# Patient Record
Sex: FEMALE | Race: OTHER | HISPANIC OR LATINO | URBAN - METROPOLITAN AREA
[De-identification: names, ages, dates, MRNs, and addresses within clinical notes are randomized per-mention and may not be internally consistent; named-entity substitution may affect disease eponyms.]

---

## 2020-04-14 ENCOUNTER — EMERGENCY (EMERGENCY)
Facility: HOSPITAL | Age: 25
LOS: 1 days | Discharge: HOME | End: 2020-04-14
Attending: EMERGENCY MEDICINE | Admitting: EMERGENCY MEDICINE
Payer: MEDICAID

## 2020-04-14 VITALS
RESPIRATION RATE: 20 BRPM | OXYGEN SATURATION: 97 % | HEART RATE: 88 BPM | SYSTOLIC BLOOD PRESSURE: 110 MMHG | DIASTOLIC BLOOD PRESSURE: 80 MMHG

## 2020-04-14 VITALS
HEART RATE: 106 BPM | RESPIRATION RATE: 17 BRPM | SYSTOLIC BLOOD PRESSURE: 110 MMHG | DIASTOLIC BLOOD PRESSURE: 78 MMHG | TEMPERATURE: 99 F | OXYGEN SATURATION: 100 %

## 2020-04-14 PROCEDURE — 99284 EMERGENCY DEPT VISIT MOD MDM: CPT | Mod: 25

## 2020-04-14 PROCEDURE — 71045 X-RAY EXAM CHEST 1 VIEW: CPT | Mod: 26

## 2020-04-14 PROCEDURE — 93010 ELECTROCARDIOGRAM REPORT: CPT

## 2020-04-14 RX ORDER — ACETAMINOPHEN 500 MG
650 TABLET ORAL ONCE
Refills: 0 | Status: COMPLETED | OUTPATIENT
Start: 2020-04-14 | End: 2020-04-14

## 2020-04-14 RX ADMIN — Medication 650 MILLIGRAM(S): at 12:48

## 2020-04-14 NOTE — ED PROVIDER NOTE - CLINICAL SUMMARY MEDICAL DECISION MAKING FREE TEXT BOX
26 y/o female with hx of asthma presenting to the ED c/o cough, SOB, chest discomfort, body aches x 4 days. Patient tachy, EKG with isolated t wave inversion in lead III and V3, otherwise vitals stable and patient not hypoxic at rest or on ambulatory trial (97% on RA). Will obtain CXR, reassess.

## 2020-04-14 NOTE — ED ADULT NURSE NOTE - OBJECTIVE STATEMENT
flu-like symptoms x4 days with worsening SOB and chest tightness. PTs mother is COVID-19 positive. Only PMH is asthma.

## 2020-04-14 NOTE — ED PROVIDER NOTE - OBJECTIVE STATEMENT
26 y/o female with hx of asthma is presenting to the ED c/o SOB x 3 days. Reports cough, body aches, chest tightness since April 11th and has been using her albuterol inhaler more frequently than normal. Her mother was dx with COVID. She denies fever or chills. Reports occasional nausea, no vomiting or diarrhea. States she feels lightheaded when she walks. Smokes hookah.

## 2020-04-14 NOTE — ED PROVIDER NOTE - NS ED ROS FT
CONST: no fevers, no chills  EYES: no pain, no vision changes  ENT: no sore throat, no ear pain, no change in hearing  CV: + chest pain, no leg swelling  RESP: + shortness of breath, + cough  ABD: no abdominal pain, + nausea, no vomiting, no diarrhea  : no dysuria, no flank pain, no hematuria  MSK: no back pain, no extremity pain  NEURO: no headache or additional neurologic complaints  HEME: no easy bleeding  SKIN:  no rash

## 2020-04-14 NOTE — ED ADULT TRIAGE NOTE - CHIEF COMPLAINT QUOTE
Pt c/o SOB, tightness of chest, body aches, dizziness, cough and sweats x4 days.  Mother tested positive for covid 19.  Hx asthma

## 2020-04-14 NOTE — ED PROVIDER NOTE - PHYSICAL EXAMINATION
GENERAL: Awake, alert, NAD  HEENT: NC/AT, moist mucous membranes, PERRL  LUNGS: CTAB, no wheezes or crackles   CARDIAC: tachycardic, regular rhythm, no m/r/g  ABDOMEN: Soft, normal BS, non tender, non distended, no rebound, no guarding  BACK: No midline spinal tenderness, no CVA tenderness  EXT: No edema, no calf tenderness, 2+ DP pulses bilaterally, no deformities.  NEURO: A&Ox3. Moving all extremities.  SKIN: Warm and dry. No rash.  PSYCH: Normal affect.

## 2020-04-14 NOTE — ED PROVIDER NOTE - NSFOLLOWUPINSTRUCTIONS_ED_ALL_ED_FT
Your symptoms are consistent with the new novel coronavirus, COVID-19.   Please self-isolate at home for 14 days from symptom onset.   Please only return to the emergency department if you experience difficulty breathing, unrelenting fevers, or are not able to tolerate food/liquids by mouth. Otherwise, please recover at home.

## 2020-04-14 NOTE — ED PROVIDER NOTE - PROGRESS NOTE DETAILS
pt no distress, walked back from bathroom without distress. talking in full sentences. pulse ox 97 RA. will dc home. strict return precautions. pt no distress, walked back from bathroom without distress. talking in full sentences. ambulatory pulse ox 97 RA. urine hcg neg. will dc home. strict return precautions. pt requested to have urine check, no dysuria/hematuria or frequency. ua: neg for nitrite/leuk no ketone. will dc home.

## 2020-04-17 ENCOUNTER — EMERGENCY (EMERGENCY)
Facility: HOSPITAL | Age: 25
LOS: 1 days | Discharge: HOME | End: 2020-04-17
Attending: EMERGENCY MEDICINE
Payer: MEDICAID

## 2020-04-17 VITALS
SYSTOLIC BLOOD PRESSURE: 107 MMHG | OXYGEN SATURATION: 98 % | TEMPERATURE: 99 F | DIASTOLIC BLOOD PRESSURE: 66 MMHG | HEART RATE: 106 BPM | RESPIRATION RATE: 22 BRPM

## 2020-04-17 VITALS — RESPIRATION RATE: 20 BRPM | OXYGEN SATURATION: 97 %

## 2020-04-17 PROCEDURE — 99283 EMERGENCY DEPT VISIT LOW MDM: CPT | Mod: 25

## 2020-04-17 PROCEDURE — 93010 ELECTROCARDIOGRAM REPORT: CPT

## 2020-04-17 PROCEDURE — 71045 X-RAY EXAM CHEST 1 VIEW: CPT | Mod: 26

## 2020-04-17 RX ORDER — ACETAMINOPHEN 500 MG
650 TABLET ORAL ONCE
Refills: 0 | Status: COMPLETED | OUTPATIENT
Start: 2020-04-17 | End: 2020-04-17

## 2020-04-17 RX ADMIN — Medication 650 MILLIGRAM(S): at 07:57

## 2020-04-17 NOTE — ED PROVIDER NOTE - NSFOLLOWUPINSTRUCTIONS_ED_ALL_ED_FT
YOU WERE SEEN IN THE ED FOR A LIKELY VIRAL ILLNESS. WE CANNOT PERFORM DEFINITIVE TESTING AT THIS TIME FOR THE NOVEL CORONAVIRUS. PLEASE READ THE INFORMATION PACKET PROVIDED TO YOU CAREFULLY.     YOU SHOULD SELF-QUARANTINE FOR 14 DAYS TO AVOID POTENTIAL SPREAD OF THE CORONAVIRUS.     YOU WERE TESTED FOR MORE COMMON VIRUSES (THE FLU AND COMMON COLD VIRUSES). YOU WILL BE CONTACTED IF YOU TEST POSITIVE FOR ANY OTHER VIRUSES.    YOU WILL BE CONTACTED BY [facility] OR YOUR LOCAL HEALTH DEPARTMENT AFTER YOUR DISCHARGE TO HELP GUIDE YOU IN YOUR QUARANTINE AND ADVISE YOU FURTHER.    Return to the ED for difficulty breathing.    You may over the counter acetaminophen (Tylenol) 650mg every 6 hours as needed for fever or pain. There is some concern in the medical community about using ibuprofen (Advil, Motrin) and other NSAIDs in people with COVID infections and until there is more research on this subject it may be best to avoid NSAIDs like ibuprofen at the moment unless you have an allergy to acetaminophen (Tylenol).  Do NOT exceed 3500mg acetaminophen in 24 hours.  Please do not take these medications if you do not have pain or fever or if you have any history of liver disease.    -------------    What is a coronavirus?  Coronaviruses are a large family of viruses that cause illnesses ranging from the common cold to more severe diseases such as Middle East Respiratory Syndrome (MERS) and Severe Acute Respiratory Syndrome (SARS).    What is Novel Coronavirus (COVID-19)?  The Centers for Disease Control and Prevention (CDC) is closely monitoring the outbreak caused by COVID-19. For the latest information about COVID-19, visit the CDC website at CDC.gov/Coronavirus    How are coronaviruses spread?  Coronaviruses can be transmitted from person to person, usually after close contact with an infected  person (for example, in a household, workplace, or healthcare setting), via droplets that become airborne after a cough or sneeze. These droplets can then infect a nearby person. Transmission can also occur by touching recently contaminated surfaces.    Is there a treatment for a COVID-19?  There is no specific treatment for disease caused by COVID-19. However, many of the symptoms can be treated based on the patient’s clinical condition. Supportive care for infected persons can be highly effective.    What are the symptoms of coronavirus infection?  It depends on the virus, but common signs include fever and/or respiratory symptoms such as cough and shortness of breath. In more severe cases, infection can cause pneumonia, severe acute respiratory syndrome, kidney failure and even death. Fortunately, most cases of COVID-19 have an illness no different than the influenza (flu), with a majority of these patients having mild symptoms and overall mortality which appears to be not much different than the flu.    What can I do to protect myself?  The best precautionary measures:  – washing your hands  – covering your cough  – disinfecting surfaces  – it is also advisable to avoid close contact with anyone showing symptoms of respiratory illness such as coughing and sneezing  – those with symptoms should wear a surgical mask when around others    What can I do to protect those around me?  If you have been identified as someone who may be infected with COVID-19, we recommend you follow the self-isolation procedures outlined on the following page to protect those around you and to limit the spread of this virus.    We recommend the below precautionary steps from now until 14 days from when you returned from your travel or date of your last known possible contact:    — Do not go to work, school or public areas. Avoid using public transportation, ridesharing or taxis.  — As much as possible, separate yourself from other people in your home. If you can, you should stay in a room and away from other people. Also, you should use a separate bathroom if available.  — Wear the supplied mask whenever you are around other people.  — If you have a non-urgent medical appointment, please reschedule for a later date. If the appointment is urgent, please call the health care provider and tell them that you are on self-isolation for possible COVID-19. This will help the health care provider’s office take steps to keep other people from getting infected or exposed. If you can reschedule routine appointments, do so.  — Wash your hands often with soap and water for at least 15 to 20 seconds or clean your hands with an alcohol-based hand  that contains 60 to 95% alcohol, covering all surfaces of your hands and rubbing them together until they feel dry. Soap and water should be used preferentially if hands are visibly dirty.  — Cover your mouth and nose with a tissue when you cough or sneeze. Throw used tissues in a lined trash can. Immediately wash your hands.  — Avoid touching your eyes, nose, and mouth with your hands.  — Avoid sharing personal household items. You should not share dishes, drinking glasses, cups, eating utensils, towels, or bedding with other people or pets in your home. After using these items, they should be washed thoroughly with soap and water.  — Clean and disinfect all “high-touch” surfaces every day. High touch surfaces include counters, tabletops, doorknobs, light switches, remote controls, bathroom fixtures, toilets, phones, keyboards, tablets, and bedside tables. Also, clean any surfaces that may have blood, stool, or body fluids on them.

## 2020-04-17 NOTE — ED PROVIDER NOTE - ATTENDING CONTRIBUTION TO CARE
I performed a history and physical exam of the patient and discussed their management with the resident and /or advanced care provider. I reviewed the resident and /or ACP's note and agree with the documented findings and plan of care. My medical decison making and observations are found above.    I have supervised and agree with the above resident history, physical and plan with the noted differences.    viktoria: pt has been caring for mom who is covid positive.  presents with fevers, myalgias, cough, sat 100% room air, able to speak in sentences, and clear lungs.  chest film no pna or haziness.  ok to dc with good instructions for return.

## 2020-04-17 NOTE — ED PROVIDER NOTE - PATIENT PORTAL LINK FT
You can access the FollowMyHealth Patient Portal offered by [facility] by registering at the following website: [email or web address]. By joining [employer]’s FollowMyHealth portal, you will also be able to view your health information using other applications (apps) compatible with our system.

## 2020-04-17 NOTE — ED PROVIDER NOTE - NSFOLLOWUPCLINICS_GEN_ALL_ED_FT
[facility]  Cardiology  [address] [place] [address]  Phone: [phone]  Fax:   Follow Up Time: 1-3 Days

## 2020-04-17 NOTE — ED PROVIDER NOTE - NS ED ROS FT
General: + fever, + chills  HENT: denies nasal congestion, rhinorrhea  CV: denies chest pain, + palpitations  Resp: + difficulty breathing, + cough  Abdominal: denies nausea, vomiting, abdominal pain  MSK: denies muscle aches, leg swelling  Neuro: denies headaches, numbness, tingling  Skin: denies rashes, bruises

## 2020-04-17 NOTE — ED PROVIDER NOTE - CARE PROVIDER_API CALL
Mike Bates (DO)  Cardiovascular Disease; Internal Medicine; Nuclear Cardiology  [address] [place] [address], [place] [address]  Phone: [phone]  Fax: [phone]  Follow Up Time: 1-3 Days

## 2020-04-17 NOTE — ED PROVIDER NOTE - PROGRESS NOTE DETAILS
Friedman: no brugada, wpw or prolonged qt noted one ekg Friedman: amb sat 97% Friedman: rj sat 97%, provided cards follow up list, return precautions provided

## 2020-04-17 NOTE — ED ADULT NURSE NOTE - NSIMPLEMENTINTERV_GEN_ALL_ED
Implemented All Universal Safety Interventions:  [place] to call system. Call bell, personal items and telephone within reach. Instruct patient to call for assistance. Room bathroom lighting operational. Non-slip footwear when patient is off stretcher. Physically safe environment: no spills, clutter or unnecessary equipment. Stretcher in lowest position, wheels locked, appropriate side rails in place.

## 2020-04-17 NOTE — ED PROVIDER NOTE - OBJECTIVE STATEMENT
25F w/ h/o asthma p/w SOB, fevers, body aches for the past 6 days. States she woke up w/ palpitations this morning, prompting ED visit. States her mother was diagnosed w/ covid 19 and she has been taking care of her for two weeks. Denies n/v/d.

## 2020-04-17 NOTE — ED ADULT NURSE NOTE - OBJECTIVE STATEMENT
pt a&o x3 c/o sob, fevers and bodyaches x 6 days. reports chest pain today. states her mother is + covid. nad noted. md assessed. will monitor

## 2020-04-17 NOTE — ED PROVIDER NOTE - CLINICAL SUMMARY MEDICAL DECISION MAKING FREE TEXT BOX
25F w/ h/o asthma p/w symptoms consistent w/ viral illness, sating 100% on RA, lungs ctab, no wheezes, doubt asthma exacerbation, will obtain ekg for palpitations, cxr, tylenol 25F w/ h/o asthma p/w symptoms consistent w/ viral illness, sating 100% on RA, lungs ctab, no wheezes, doubt asthma exacerbation, will obtain ekg for palpitations, cxr, tylenol    viktoria: pt has been caring for mom who is covid positive.  presents with fevers, myalgias, cough, sat 100% room air, able to speak in sentences, and clear lungs.  chest film no pna or haziness.  ok to dc with good instructions for return.

## 2020-04-17 NOTE — ED ADULT TRIAGE NOTE - CHIEF COMPLAINT QUOTE
pt arrives w/ c/o chest pain, sob, cough. pt states her mom has covid-19 and she was around her. pt states she was here on 4/11 and was discharged home after only getting Tylenol.

## 2020-07-01 ENCOUNTER — EMERGENCY (EMERGENCY)
Facility: HOSPITAL | Age: 25
LOS: 1 days | Discharge: HOME | End: 2020-07-01
Attending: EMERGENCY MEDICINE | Admitting: EMERGENCY MEDICINE
Payer: MEDICAID

## 2020-07-01 VITALS
OXYGEN SATURATION: 100 % | TEMPERATURE: 98 F | SYSTOLIC BLOOD PRESSURE: 106 MMHG | HEART RATE: 105 BPM | DIASTOLIC BLOOD PRESSURE: 67 MMHG | RESPIRATION RATE: 16 BRPM

## 2020-07-01 VITALS
OXYGEN SATURATION: 100 % | RESPIRATION RATE: 16 BRPM | SYSTOLIC BLOOD PRESSURE: 105 MMHG | DIASTOLIC BLOOD PRESSURE: 64 MMHG | HEART RATE: 128 BPM | TEMPERATURE: 100 F

## 2020-07-01 PROBLEM — J45.909 UNSPECIFIED ASTHMA, UNCOMPLICATED: Chronic | Status: ACTIVE | Noted: 2020-04-17

## 2020-07-01 LAB
ALBUMIN SERPL ELPH-MCNC: 4.7 G/DL — SIGNIFICANT CHANGE UP (ref 3.3–5)
ALP SERPL-CCNC: 75 U/L — SIGNIFICANT CHANGE UP (ref 40–120)
ALT FLD-CCNC: 9 U/L — SIGNIFICANT CHANGE UP (ref 4–33)
ANION GAP SERPL CALC-SCNC: 15 MMO/L — HIGH (ref 7–14)
AST SERPL-CCNC: 19 U/L — SIGNIFICANT CHANGE UP (ref 4–32)
BASE EXCESS BLDV CALC-SCNC: -1.5 MMOL/L — SIGNIFICANT CHANGE UP
BASOPHILS # BLD AUTO: 0.01 K/UL — SIGNIFICANT CHANGE UP (ref 0–0.2)
BASOPHILS NFR BLD AUTO: 0.1 % — SIGNIFICANT CHANGE UP (ref 0–2)
BILIRUB SERPL-MCNC: 0.6 MG/DL — SIGNIFICANT CHANGE UP (ref 0.2–1.2)
BLOOD GAS VENOUS - CREATININE: 0.69 MG/DL — SIGNIFICANT CHANGE UP (ref 0.5–1.3)
BUN SERPL-MCNC: 10 MG/DL — SIGNIFICANT CHANGE UP (ref 7–23)
CALCIUM SERPL-MCNC: 9.2 MG/DL — SIGNIFICANT CHANGE UP (ref 8.4–10.5)
CHLORIDE BLDV-SCNC: 106 MMOL/L — SIGNIFICANT CHANGE UP (ref 96–108)
CHLORIDE SERPL-SCNC: 98 MMOL/L — SIGNIFICANT CHANGE UP (ref 98–107)
CO2 SERPL-SCNC: 21 MMOL/L — LOW (ref 22–31)
CREAT SERPL-MCNC: 0.58 MG/DL — SIGNIFICANT CHANGE UP (ref 0.5–1.3)
CULTURE RESULTS: SIGNIFICANT CHANGE UP
EOSINOPHIL # BLD AUTO: 0 K/UL — SIGNIFICANT CHANGE UP (ref 0–0.5)
EOSINOPHIL NFR BLD AUTO: 0 % — SIGNIFICANT CHANGE UP (ref 0–6)
GAS PNL BLDV: 134 MMOL/L — LOW (ref 136–146)
GLUCOSE BLDV-MCNC: 114 MG/DL — HIGH (ref 70–99)
GLUCOSE SERPL-MCNC: 117 MG/DL — HIGH (ref 70–99)
HCO3 BLDV-SCNC: 23 MMOL/L — SIGNIFICANT CHANGE UP (ref 20–27)
HCT VFR BLD CALC: 39.8 % — SIGNIFICANT CHANGE UP (ref 34.5–45)
HCT VFR BLDV CALC: 44.1 % — SIGNIFICANT CHANGE UP (ref 34.5–45)
HGB BLD-MCNC: 13.8 G/DL — SIGNIFICANT CHANGE UP (ref 11.5–15.5)
HGB BLDV-MCNC: 14.4 G/DL — SIGNIFICANT CHANGE UP (ref 11.5–15.5)
IMM GRANULOCYTES NFR BLD AUTO: 0.7 % — SIGNIFICANT CHANGE UP (ref 0–1.5)
LACTATE BLDV-MCNC: 1.1 MMOL/L — SIGNIFICANT CHANGE UP (ref 0.5–2)
LYMPHOCYTES # BLD AUTO: 0.53 K/UL — LOW (ref 1–3.3)
LYMPHOCYTES # BLD AUTO: 7.7 % — LOW (ref 13–44)
MCHC RBC-ENTMCNC: 31.2 PG — SIGNIFICANT CHANGE UP (ref 27–34)
MCHC RBC-ENTMCNC: 34.7 % — SIGNIFICANT CHANGE UP (ref 32–36)
MCV RBC AUTO: 90 FL — SIGNIFICANT CHANGE UP (ref 80–100)
MONOCYTES # BLD AUTO: 0.39 K/UL — SIGNIFICANT CHANGE UP (ref 0–0.9)
MONOCYTES NFR BLD AUTO: 5.6 % — SIGNIFICANT CHANGE UP (ref 2–14)
NEUTROPHILS # BLD AUTO: 5.93 K/UL — SIGNIFICANT CHANGE UP (ref 1.8–7.4)
NEUTROPHILS NFR BLD AUTO: 85.9 % — HIGH (ref 43–77)
NRBC # FLD: 0 K/UL — SIGNIFICANT CHANGE UP (ref 0–0)
PCO2 BLDV: 35 MMHG — LOW (ref 41–51)
PH BLDV: 7.42 PH — SIGNIFICANT CHANGE UP (ref 7.32–7.43)
PLATELET # BLD AUTO: 269 K/UL — SIGNIFICANT CHANGE UP (ref 150–400)
PMV BLD: 10.1 FL — SIGNIFICANT CHANGE UP (ref 7–13)
PO2 BLDV: 62 MMHG — HIGH (ref 35–40)
POTASSIUM BLDV-SCNC: 3.3 MMOL/L — LOW (ref 3.4–4.5)
POTASSIUM SERPL-MCNC: 3.3 MMOL/L — LOW (ref 3.5–5.3)
POTASSIUM SERPL-SCNC: 3.3 MMOL/L — LOW (ref 3.5–5.3)
PROT SERPL-MCNC: 7.8 G/DL — SIGNIFICANT CHANGE UP (ref 6–8.3)
RBC # BLD: 4.42 M/UL — SIGNIFICANT CHANGE UP (ref 3.8–5.2)
RBC # FLD: 11.8 % — SIGNIFICANT CHANGE UP (ref 10.3–14.5)
SAO2 % BLDV: 91.9 % — HIGH (ref 60–85)
SODIUM SERPL-SCNC: 134 MMOL/L — LOW (ref 135–145)
SPECIMEN SOURCE: SIGNIFICANT CHANGE UP
WBC # BLD: 6.91 K/UL — SIGNIFICANT CHANGE UP (ref 3.8–10.5)
WBC # FLD AUTO: 6.91 K/UL — SIGNIFICANT CHANGE UP (ref 3.8–10.5)

## 2020-07-01 PROCEDURE — 99284 EMERGENCY DEPT VISIT MOD MDM: CPT

## 2020-07-01 RX ORDER — ONDANSETRON 8 MG/1
4 TABLET, FILM COATED ORAL ONCE
Refills: 0 | Status: COMPLETED | OUTPATIENT
Start: 2020-07-01 | End: 2020-07-01

## 2020-07-01 RX ORDER — ONDANSETRON 8 MG/1
1 TABLET, FILM COATED ORAL
Qty: 12 | Refills: 0
Start: 2020-07-01

## 2020-07-01 RX ORDER — ACETAMINOPHEN 500 MG
975 TABLET ORAL ONCE
Refills: 0 | Status: COMPLETED | OUTPATIENT
Start: 2020-07-01 | End: 2020-07-01

## 2020-07-01 RX ORDER — POTASSIUM CHLORIDE 20 MEQ
40 PACKET (EA) ORAL ONCE
Refills: 0 | Status: COMPLETED | OUTPATIENT
Start: 2020-07-01 | End: 2020-07-01

## 2020-07-01 RX ORDER — SODIUM CHLORIDE 9 MG/ML
1000 INJECTION INTRAMUSCULAR; INTRAVENOUS; SUBCUTANEOUS ONCE
Refills: 0 | Status: COMPLETED | OUTPATIENT
Start: 2020-07-01 | End: 2020-07-01

## 2020-07-01 RX ADMIN — SODIUM CHLORIDE 1000 MILLILITER(S): 9 INJECTION INTRAMUSCULAR; INTRAVENOUS; SUBCUTANEOUS at 12:37

## 2020-07-01 RX ADMIN — Medication 40 MILLIEQUIVALENT(S): at 11:54

## 2020-07-01 RX ADMIN — SODIUM CHLORIDE 1000 MILLILITER(S): 9 INJECTION INTRAMUSCULAR; INTRAVENOUS; SUBCUTANEOUS at 11:54

## 2020-07-01 RX ADMIN — SODIUM CHLORIDE 1000 MILLILITER(S): 9 INJECTION INTRAMUSCULAR; INTRAVENOUS; SUBCUTANEOUS at 10:50

## 2020-07-01 RX ADMIN — ONDANSETRON 4 MILLIGRAM(S): 8 TABLET, FILM COATED ORAL at 10:50

## 2020-07-01 RX ADMIN — Medication 975 MILLIGRAM(S): at 10:45

## 2020-07-01 NOTE — ED PROVIDER NOTE - PROGRESS NOTE DETAILS
GOGO Nassar: Pt tolerated PO, stool cultures sent, will give GI follow up if sx persist and advised BRAT diet.  Vitals stable will dc home

## 2020-07-01 NOTE — ED PROVIDER NOTE - NSFOLLOWUPINSTRUCTIONS_ED_ALL_ED_FT
Take zofran 4mg ODT every 4 hours as needed for nausea.  Take tylenol 650mg every 6-8 hours as needed for fever.  Avoid any fatty/greasy foods. Drink plenty of fluids. Follow BRAT diet-bananas, rice, applesauce and toast. Follow up with your pmd in 2days. Return to ED for any worsening symptoms.

## 2020-07-01 NOTE — ED PROVIDER NOTE - OBJECTIVE STATEMENT
26 yo female c pmhx asthma presents to ED c/o n/v/d x 1 day.  Pt states ate tacos night before yesterday and woke up yesterday AM with symptoms.  Pt states has been having persistent watery diarrhea and has "lost count" how many times she has gone.  has not been able to tolerate anything PO except for soup last night. +fever, + mild abdominal cramping.  No recent antibx use or recent travel.  No sick contacts.  Patient was COVID positive April 11.  LMP 2 weeks ago.

## 2020-07-01 NOTE — ED ADULT NURSE NOTE - OBJECTIVE STATEMENT
24 y/o female presents to ED with c/o nausea and diarrhea since last night.  Pt states that she started having watery diarrhea last night, multiple episodes, no blood noted in stool.  Pt also endorses chills, temp not taken at home.  Pt arrived in ED awake alert in NAD, pt denies cough, no abd pain, no CP, no SOB.  Bobbi and urine samples collected ad per order, IV established, labs drawn and sent, pt medicated as per order, providers evaluating

## 2020-07-01 NOTE — ED PROVIDER NOTE - CLINICAL SUMMARY MEDICAL DECISION MAKING FREE TEXT BOX
26 yo female c pmhx asthma presents to ED c/o n/v/d x 1 day.  +fevers, >10 episodes of watery diarrhea.  No risk factors for c.diff.  Abd soft NT, unlikely colitis or appendicitis.  r/o gastroenteritis viral vs bacterial, will check labs, send stool culture and PCR panel.  Antiemetics, fluids, tylenol for fever and reassess.

## 2020-07-01 NOTE — ED PROVIDER NOTE - PATIENT PORTAL LINK FT
You can access the FollowMyHealth Patient Portal offered by [facility] by registering at the following website: [email or web address]. By joining [email or web address]’s FollowMyHealth portal, you will also be able to view your health information using other applications (apps) compatible with our system.

## 2020-07-02 RX ORDER — AZTREONAM 2 G
1 VIAL (EA) INJECTION
Qty: 20 | Refills: 0
Start: 2020-07-02 | End: 2020-07-11

## 2020-07-02 NOTE — ED POST DISCHARGE NOTE - RESULT SUMMARY
Pt called with [facility]  ID# [id number], regarding stool culture results, which are positive for Cyclospora.  Pt confirms NKDA; Rx for Bactrim DS BID x 10 days called in to pt's pharmacy (pharmacy info verified with pt).

## 2020-07-03 LAB
CULTURE RESULTS: SIGNIFICANT CHANGE UP
SPECIMEN SOURCE: SIGNIFICANT CHANGE UP

## 2020-10-05 NOTE — ED ADULT NURSE NOTE - HOW OFTEN DO YOU HAVE A DRINK CONTAINING ALCOHOL?
Future Appointments   Date Time Provider Anthony Devine   10/15/2020 10:30 AM EM[facility] 20 NURSE EMG 20 [address] [address]   10/15/2020 11:00 AM Steve SANCHEZ, DANAEW [facility] [place]   11/27/2020  8:00 AM BK AALIYAH RM1 BK [address] [facility]

## 2022-01-02 ENCOUNTER — EMERGENCY (EMERGENCY)
Facility: HOSPITAL | Age: 27
LOS: 1 days | Discharge: HOME | End: 2022-01-02
Attending: STUDENT IN AN ORGANIZED HEALTH CARE EDUCATION/TRAINING PROGRAM | Admitting: STUDENT IN AN ORGANIZED HEALTH CARE EDUCATION/TRAINING PROGRAM
Payer: MEDICAID

## 2022-01-02 VITALS
DIASTOLIC BLOOD PRESSURE: 73 MMHG | RESPIRATION RATE: 17 BRPM | TEMPERATURE: 97 F | SYSTOLIC BLOOD PRESSURE: 113 MMHG | HEART RATE: 87 BPM

## 2022-01-02 VITALS
HEART RATE: 74 BPM | DIASTOLIC BLOOD PRESSURE: 68 MMHG | RESPIRATION RATE: 18 BRPM | SYSTOLIC BLOOD PRESSURE: 103 MMHG | OXYGEN SATURATION: 99 % | TEMPERATURE: 98 F

## 2022-01-02 LAB
APPEARANCE UR: CLEAR — SIGNIFICANT CHANGE UP
BILIRUB UR-MCNC: NEGATIVE — SIGNIFICANT CHANGE UP
COLOR SPEC: YELLOW — SIGNIFICANT CHANGE UP
DIFF PNL FLD: NEGATIVE — SIGNIFICANT CHANGE UP
GLUCOSE UR QL: NEGATIVE — SIGNIFICANT CHANGE UP
KETONES UR-MCNC: NEGATIVE — SIGNIFICANT CHANGE UP
LEUKOCYTE ESTERASE UR-ACNC: NEGATIVE — SIGNIFICANT CHANGE UP
NITRITE UR-MCNC: NEGATIVE — SIGNIFICANT CHANGE UP
PH UR: 8 — SIGNIFICANT CHANGE UP (ref 5–8)
PROT UR-MCNC: NEGATIVE — SIGNIFICANT CHANGE UP
SP GR SPEC: 1.01 — SIGNIFICANT CHANGE UP (ref 1–1.05)
UROBILINOGEN FLD QL: SIGNIFICANT CHANGE UP

## 2022-01-02 PROCEDURE — 76770 US EXAM ABDO BACK WALL COMP: CPT | Mod: 26

## 2022-01-02 PROCEDURE — 99284 EMERGENCY DEPT VISIT MOD MDM: CPT

## 2022-01-02 RX ORDER — NITROFURANTOIN MACROCRYSTAL 50 MG
100 CAPSULE ORAL ONCE
Refills: 0 | Status: COMPLETED | OUTPATIENT
Start: 2022-01-02 | End: 2022-01-02

## 2022-01-02 RX ORDER — IBUPROFEN 200 MG
400 TABLET ORAL ONCE
Refills: 0 | Status: COMPLETED | OUTPATIENT
Start: 2022-01-02 | End: 2022-01-02

## 2022-01-02 RX ORDER — NITROFURANTOIN MACROCRYSTAL 50 MG
1 CAPSULE ORAL
Qty: 14 | Refills: 0
Start: 2022-01-02 | End: 2022-01-08

## 2022-01-02 RX ORDER — ACETAMINOPHEN 500 MG
650 TABLET ORAL ONCE
Refills: 0 | Status: COMPLETED | OUTPATIENT
Start: 2022-01-02 | End: 2022-01-02

## 2022-01-02 RX ADMIN — Medication 100 MILLIGRAM(S): at 23:17

## 2022-01-02 RX ADMIN — Medication 400 MILLIGRAM(S): at 18:51

## 2022-01-02 RX ADMIN — Medication 650 MILLIGRAM(S): at 18:51

## 2022-01-02 NOTE — ED ADULT TRIAGE NOTE - CHIEF COMPLAINT QUOTE
pt states she is being treated for a UTI, but even after antibiotics pain has gotten worse and migrated to the flank.

## 2022-01-02 NOTE — ED PROVIDER NOTE - PATIENT PORTAL LINK FT
You can access the FollowMyHealth Patient Portal offered by [facility] by registering at the following website: [email or web address]. By joining [facility]’s FollowMyHealth portal, you will also be able to view your health information using other applications (apps) compatible with our system.

## 2022-01-02 NOTE — ED PROVIDER NOTE - PHYSICAL EXAMINATION
CONSTITUTIONAL: Non-toxic, non-diaphoretic, in no apparent distress  HEAD: Normocephalic; atruamatic  EYES: EOM intact   ENMT: External appears normal; normal oropharynx, moist  NECK: grossly normal active ROM,  CARD: No cyanosis, good peripheral perfusion, RRR, no MRG  RESP: Normal chest excursion with respiration; no increased work of breathing, CTAB  ABD: SNTND, no CVA tenderness  EXT: moving all extremities, no gross disfigurement or asymmetry,  SKIN: Warm, dry, no rash  NEURO:  moving all extremities, no facial droop, no dysarthria      cn2-12 intact

## 2022-01-02 NOTE — ED PROVIDER NOTE - OBJECTIVE STATEMENT
25 y/o F w/ PMHx asthma prsents w/ intermittent flank pain and abd pain w/ associated chills and lightheadedness x 2 wks. Pt is concerned for a UTI. Denies fever, vomiting, cough, diarrhea, burning when urination, or vaginal discharge. Pt is not sexually active and is a social smoker and drinker.

## 2022-01-02 NOTE — ED PROVIDER NOTE - PROGRESS NOTE DETAILS
patient given macrobid for urinary symptoms, possibility of false negative UA. Return precautions reviewed. Patient verbalized understand of conditions for return and plan for follow up.

## 2022-01-02 NOTE — ED PROVIDER NOTE - CLINICAL SUMMARY MEDICAL DECISION MAKING FREE TEXT BOX
27 y/o F presents w/ intermittent abd and flank pain x2 wks. Shared decision making w/ pt, opted for US to r/o hydronephrosis. Suspect UTI. Will order UA, pain control, and reassess.

## 2022-01-02 NOTE — ED PROVIDER NOTE - NS ED ROS FT
CONSTITUTIONAL: No fever,  Pulmonary: no cough  CARDIOVASCULAR: No chest pain,  GI: abd pain, flank pain  MUSKULOSKELETAL: No new pain in joints/muscles  ALL OTHER SYSTEMS NEGATIVE.

## 2022-01-02 NOTE — ED PROVIDER NOTE - NSFOLLOWUPINSTRUCTIONS_ED_ALL_ED_FT
Urinary Tract Infection in Women    WHAT YOU NEED TO KNOW:    A urinary tract infection (UTI) is caused by bacteria that get inside your urinary tract. Most bacteria that enter your urinary tract come out when you urinate. If the bacteria stay in your urinary tract, you may get an infection. Your urinary tract includes your kidneys, ureters, bladder, and urethra. Urine is made in your kidneys, and it flows from the ureters to the bladder. Urine leaves the bladder through the urethra. A UTI is more common in your lower urinary tract, which includes your bladder and urethra.  Female Urinary System    DISCHARGE INSTRUCTIONS:    Return to the emergency department if:    You are urinating very little or not at all.    You have a high fever with shaking chills.    You have side or back pain that gets worse.  Call your doctor if:    You have a fever.    You do not feel better after 2 days of taking antibiotics.    You are vomiting.    You have questions or concerns about your condition or care.  Medicines:    Antibiotics help fight a bacterial infection. If you have UTIs often (called recurrent UTIs), you may be given antibiotics to take regularly. You will be given directions for when and how to use antibiotics. The goal is to prevent UTIs but not cause antibiotic resistance by using antibiotics too often.    Medicines may be given to decrease pain and burning when you urinate. They will also help decrease the feeling that you need to urinate often. These medicines will make your urine orange or red.    Take your medicine as directed. Contact your healthcare provider if you think your medicine is not helping or if you have side effects. Tell him or her if you are allergic to any medicine. Keep a list of the medicines, vitamins, and herbs you take. Include the amounts, and when and why you take them. Bring the list or the pill bottles to follow-up visits. Carry your medicine list with you in case of an emergency.  Follow up with your doctor as directed: Write down your questions so you remember to ask them during your visits.    Prevent another UTI:    Empty your bladder often. Urinate and empty your bladder as soon as you feel the need. Do not hold your urine for long periods of time.    Wipe from front to back after you urinate or have a bowel movement. This will help prevent germs from getting into your urinary tract through your urethra.    Drink liquids as directed. Ask how much liquid to drink each day and which liquids are best for you. You may need to drink more liquids than usual to help flush out the bacteria. Do not drink alcohol, caffeine, or citrus juices. These can irritate your bladder and increase your symptoms. Your healthcare provider may recommend cranberry juice to help prevent a UTI.    Urinate after you have sex. This can help flush out bacteria passed during sex.    Do not douche or use feminine deodorants. These can change the chemical balance in your vagina.    Change sanitary pads or tampons often. This will help prevent germs from getting into your urinary tract.    Talk to your healthcare provider about your birth control method. You may need to change your method if it is increasing your risk for UTIs.    Wear cotton underwear and clothes that are loose. Tight pants and nylon underwear can trap moisture and cause bacteria to grow.    Vaginal estrogen may be recommended. This medicine helps prevent UTIs in women who have gone through menopause or are in tangela-menopause.    Do pelvic muscle exercises often. Pelvic muscle exercises may help you start and stop urinating. Strong pelvic muscles may help you empty your bladder easier. Squeeze these muscles tightly for 5 seconds like you are trying to hold back urine. Then relax for 5 seconds. Gradually work up to squeezing for 10 seconds. Do 3 sets of 15 repetitions a day, or as directed.

## 2022-01-04 LAB
CULTURE RESULTS: SIGNIFICANT CHANGE UP
SPECIMEN SOURCE: SIGNIFICANT CHANGE UP

## 2022-04-07 ENCOUNTER — EMERGENCY (EMERGENCY)
Facility: HOSPITAL | Age: 27
LOS: 1 days | Discharge: HOME | End: 2022-04-07
Attending: EMERGENCY MEDICINE | Admitting: EMERGENCY MEDICINE
Payer: MEDICAID

## 2022-04-07 VITALS
TEMPERATURE: 98 F | RESPIRATION RATE: 16 BRPM | DIASTOLIC BLOOD PRESSURE: 72 MMHG | HEART RATE: 77 BPM | OXYGEN SATURATION: 100 % | SYSTOLIC BLOOD PRESSURE: 116 MMHG

## 2022-04-07 PROCEDURE — 99284 EMERGENCY DEPT VISIT MOD MDM: CPT | Mod: 25

## 2022-04-07 PROCEDURE — 93010 ELECTROCARDIOGRAM REPORT: CPT

## 2022-04-07 NOTE — ED ADULT TRIAGE NOTE - CHIEF COMPLAINT QUOTE
Pt palpitations, numbness and tingling since yesterday. pt states she has been having inc anxiety. pt denies any reason for the anxiety or hx. Pt wasn't able to sleep last. No complaints of chest pain, headache, nausea, vomiting  SOB, fever, chills verbalized. pt states she has dec sensation to left arm. No facial droop or slurred speech noted. Pt has equal strength and motor, to bilateral upper and lower extremities. No drifts noted to bilateral upper and lower extremities.

## 2022-04-08 NOTE — ED PROVIDER NOTE - OBJECTIVE STATEMENT
26F h/o asthma presents with 2 days of intermittent episodes of palpitations, chest pressure, arm tingling and lightheadedness. Pt states that symptoms started last night, lasted a few hours. Eventually fell asleep, woke up and felt fine and all days felt fine with exception of feeling tired so she drank coffee. Then felt very jittery and started breathing heavily and felt restless and had to get up and walk around. Felt chest pressure and L arm tingling. Currently feeling asymptomatic. Denies leg pain or swelling, sob.

## 2022-04-08 NOTE — ED PROVIDER NOTE - NSFOLLOWUPINSTRUCTIONS_ED_ALL_ED_FT
You will be contacted by our discharge planners to help set up a primary care appointment. If any of your symptoms worsen in any way, please return to the ER.

## 2022-04-08 NOTE — ED PROVIDER NOTE - PATIENT PORTAL LINK FT
You can access the FollowMyHealth Patient Portal offered by [facility] by registering at the following website: [email or web address]. By joining [employer]’s FollowMyHealth portal, you will also be able to view your health information using other applications (apps) compatible with our system.

## 2022-04-08 NOTE — ED PROVIDER NOTE - CLINICAL SUMMARY MEDICAL DECISION MAKING FREE TEXT BOX
Healthy female with intermittent palpitations, "jittery" feeling, lightheaded and paresthesias. Well appearing, VSS, EKG WNL, physical exam normal w/o neuro deficits, normal cardiopulm exam. Suspect component of anxiety. Pt requesting new PMD, will have d/c planners arrange for f/u. Return precautions to ED discussed. No c/f ACS given no risk factors, non ischemic ekg.

## 2022-05-02 ENCOUNTER — EMERGENCY (EMERGENCY)
Facility: HOSPITAL | Age: 27
LOS: 1 days | Discharge: HOME | End: 2022-05-02
Attending: STUDENT IN AN ORGANIZED HEALTH CARE EDUCATION/TRAINING PROGRAM | Admitting: STUDENT IN AN ORGANIZED HEALTH CARE EDUCATION/TRAINING PROGRAM
Payer: MEDICAID

## 2022-05-02 VITALS
HEART RATE: 89 BPM | OXYGEN SATURATION: 99 % | SYSTOLIC BLOOD PRESSURE: 112 MMHG | RESPIRATION RATE: 16 BRPM | TEMPERATURE: 98 F | DIASTOLIC BLOOD PRESSURE: 79 MMHG

## 2022-05-02 LAB
APPEARANCE UR: ABNORMAL
BACTERIA # UR AUTO: ABNORMAL
BILIRUB UR-MCNC: ABNORMAL
COLOR SPEC: ABNORMAL
DIFF PNL FLD: ABNORMAL
EPI CELLS # UR: 3 /HPF — SIGNIFICANT CHANGE UP (ref 0–5)
GLUCOSE UR QL: NEGATIVE — SIGNIFICANT CHANGE UP
HIV 1+2 AB+HIV1 P24 AG SERPL QL IA: SIGNIFICANT CHANGE UP
HYALINE CASTS # UR AUTO: 3 /LPF — SIGNIFICANT CHANGE UP (ref 0–7)
KETONES UR-MCNC: NEGATIVE — SIGNIFICANT CHANGE UP
LEUKOCYTE ESTERASE UR-ACNC: ABNORMAL
NITRITE UR-MCNC: POSITIVE
PH UR: 6.5 — SIGNIFICANT CHANGE UP (ref 5–8)
PROT UR-MCNC: ABNORMAL
RBC CASTS # UR COMP ASSIST: 9 /HPF — HIGH (ref 0–4)
SP GR SPEC: 1.02 — SIGNIFICANT CHANGE UP (ref 1–1.05)
T PALLIDUM AB TITR SER: NEGATIVE — SIGNIFICANT CHANGE UP
UROBILINOGEN FLD QL: ABNORMAL
WBC UR QL: 208 /HPF — HIGH (ref 0–5)

## 2022-05-02 PROCEDURE — 99284 EMERGENCY DEPT VISIT MOD MDM: CPT

## 2022-05-02 RX ORDER — ACETAMINOPHEN 500 MG
975 TABLET ORAL ONCE
Refills: 0 | Status: COMPLETED | OUTPATIENT
Start: 2022-05-02 | End: 2022-05-02

## 2022-05-02 RX ORDER — IBUPROFEN 200 MG
600 TABLET ORAL ONCE
Refills: 0 | Status: COMPLETED | OUTPATIENT
Start: 2022-05-02 | End: 2022-05-02

## 2022-05-02 RX ORDER — CEPHALEXIN 500 MG
1 CAPSULE ORAL
Qty: 14 | Refills: 0
Start: 2022-05-02 | End: 2022-05-08

## 2022-05-02 RX ORDER — CEPHALEXIN 500 MG
500 CAPSULE ORAL ONCE
Refills: 0 | Status: COMPLETED | OUTPATIENT
Start: 2022-05-02 | End: 2022-05-02

## 2022-05-02 RX ADMIN — Medication 600 MILLIGRAM(S): at 14:45

## 2022-05-02 RX ADMIN — Medication 500 MILLIGRAM(S): at 14:46

## 2022-05-02 RX ADMIN — Medication 975 MILLIGRAM(S): at 13:58

## 2022-05-02 NOTE — ED PROVIDER NOTE - ATTENDING CONTRIBUTION TO CARE
I have personally performed a face to face medical and diagnostic evaluation of the patient. I have discussed with and reviewed the Resident's note and agree with the History, ROS, Physical Exam and MDM unless otherwise indicated. A brief summary of my personal evaluation and impression can be found below.    27F hx of prior UTIs, HSV infection presents with a cc of dysuria a/w urinary frequency a/w mild b/l lower back pain that is c/w prior episodes of UTI. Has been taking AZO at home w/o relief, took home UTI test that was positive. No fever. No nausea or vomiting. No rashes, lesions vaginal discharge or bleeding. Also notes mild suprapubic pain.     All other ROS negative, except as above and as per HPI and ROS section.    VITALS: Initial triage and subsequent vitals have been reviewed by me.  GEN APPEARANCE: WDWN, alert, non-toxic, NAD  HEAD: Atraumatic.  EYES: PERRLa, EOMI, vision grossly intact.   NECK: Supple  CV: RRR, S1S2, no c/r/m/g. Cap refill <2 seconds. No bruits.   LUNGS: CTAB. No abnormal breath sounds.  ABDOMEN: mild suprapubic ttp. No guarding or rebound.   MSK/EXT: No spinal or extremity point tenderness. No CVA ttp. Pelvis stable. No peripheral edema.  NEURO: Alert, follows commands. Weight bearing normal. Speech normal. Sensation and motor normal x4 extremities.   SKIN: Warm, dry and intact. No rash.  PSYCH: Appropriate   Exam (Female): External genitalia normal, no e/o bleeding, trauma. Cervix w/o CMT, no adnexal ttp b/l. Os is closed; no e/o discharge. No blood in vaginal vault.   EXAM PER RENATO SHARIF, chaperoned by me.       Plan/MDM: 27F hx of prior UTIs, HSV infection presents with a cc of dysuria a/w urinary frequency a/w mild b/l lower back pain that is c/w prior episodes of UTI. Has been taking AZO at home w/o relief, took home UTI test that was positive, exam vss non toxic PE as above ddx c/f likely UTI less c/f pyelo, pt would like std screen, check pelvic UA meds as needed and reassess.

## 2022-05-02 NOTE — ED PROVIDER NOTE - PATIENT PORTAL LINK FT
You can access the FollowMyHealth Patient Portal offered by [facility] by registering at the following website: [email or web address]. By joining [facility]’s FollowMyHealth portal, you will also be able to view your health information using other applications (apps) compatible with our system.

## 2022-05-02 NOTE — ED PROVIDER NOTE - CLINICAL SUMMARY MEDICAL DECISION MAKING FREE TEXT BOX
27F PMH recurrent UTI, herpes infection presenting with 2 weeks of intermittent dysuria, urinary frequency and suprapubic pain. Given hx and physical, likely UTI. Will eval for PID with pelvic exam though lower suspicion as pt reports no discharge and states sx are similar to prior UTI. Lower suspicion for pyelo at this time as no fever, chills n/v. Plan for UA, Ucx, pelvic exam, STD, and likely dc with abx.

## 2022-05-02 NOTE — ED PROVIDER NOTE - NSFOLLOWUPCLINICSTOKEN_GEN_ALL_ED_FT
[id number]: || ||00\01||False;[id number]: || ||00\01||False;[id number]: || ||00\01||False;[id number]: || ||00\01||False;[id number]: || ||00\01||False;

## 2022-05-02 NOTE — ED PROVIDER NOTE - NSFOLLOWUPCLINICS_GEN_ALL_ED_FT
[facility] - Ambulatory Care Clinic  OB/GYN & Surg  [address] [place] [address]  Phone: [phone]  Fax:     [facility] - Primary Care  Primary Care  [address] [place] [id number]  Phone: [phone]  Fax:     [facility] Gynecology and Obstetrics  Gynceology/OB  [address] [place] [address]  Phone: [phone]  Fax:     [facility]  OB-GYN  [address] [place] [address].  [place] [address]  Phone: [phone]  Fax:     [facility]  OBGYN  [address] [place] [address]  Phone: [phone]  Fax: [phone]

## 2022-05-02 NOTE — ED PROVIDER NOTE - NSFOLLOWUPINSTRUCTIONS_ED_ALL_ED_FT
Thank you for visiting our Emergency Department, it has been a pleasure taking part in your healthcare.    Your discharge diagnosis is: UTI  Please take all discharge medications as indicated below:  Take Motrin/Tylenol for pain as needed, please follow instructions on manufacturers label. If you have any questions please consult a pharmacist or your PMD.  Keflex 500mg twice a day x7 days  Please follow up with your PMD within x48 hours.  A copy of resulted labs, imaging, and findings have been provided to you.   You have had a detailed discussion with your provider regarding your diagnosis, care management and discharge planning including, but not limited to: return precautions, follow up visits with existing or new providers, new prescriptions and/or medication changes, wound and/or splint/cast care or other care   aspects specific to your diagnosis and treatment. You have been given the opportunity to have your questions answered. At this time you have been deemed stable and fit for discharge.  Return precautions to the Emergency Department include but are not limited to: unrelenting nausea, vomiting, fever, chills, chest pain, shortness of breath, dizziness, chest or abdominal pain, worsening back pain, syncope, blood in urine or stool, headache that doesn't resolve, numbness or tingling, loss of sensation, loss of motor function, or any other concerning symptoms.

## 2022-05-02 NOTE — ED PROVIDER NOTE - OBJECTIVE STATEMENT
27F PMH recurrent UTI, herpes infection presenting with 2 weeks of intermittent dysuria, urinary frequency and suprapubic pain. Pt denies hematuria, upper back pain, fever, n/v, chest pain, sob, cough. Pt sexually active with 1 partner, uses condoms for protection. Denies abnormal vaginal discharge, vaginal rash/lesions. LMP April 11, 2022.

## 2022-05-03 LAB
C TRACH RRNA SPEC QL NAA+PROBE: SIGNIFICANT CHANGE UP
N GONORRHOEA RRNA SPEC QL NAA+PROBE: SIGNIFICANT CHANGE UP

## 2025-01-15 NOTE — ED PROVIDER NOTE - NSPTACCESSSVCSAPPT_ED_ALL_ED
